# Patient Record
Sex: MALE | Race: WHITE | NOT HISPANIC OR LATINO | Employment: FULL TIME | URBAN - METROPOLITAN AREA
[De-identification: names, ages, dates, MRNs, and addresses within clinical notes are randomized per-mention and may not be internally consistent; named-entity substitution may affect disease eponyms.]

---

## 2019-07-01 ENCOUNTER — OFFICE VISIT (OUTPATIENT)
Dept: OBGYN CLINIC | Facility: CLINIC | Age: 39
End: 2019-07-01
Payer: COMMERCIAL

## 2019-07-01 VITALS
HEIGHT: 77 IN | SYSTOLIC BLOOD PRESSURE: 116 MMHG | HEART RATE: 72 BPM | DIASTOLIC BLOOD PRESSURE: 75 MMHG | BODY MASS INDEX: 27.16 KG/M2 | WEIGHT: 230 LBS

## 2019-07-01 DIAGNOSIS — M25.512 ACUTE PAIN OF LEFT SHOULDER: Primary | ICD-10-CM

## 2019-07-01 PROCEDURE — 99203 OFFICE O/P NEW LOW 30 MIN: CPT | Performed by: ORTHOPAEDIC SURGERY

## 2019-07-01 RX ORDER — GABAPENTIN 600 MG/1
600 TABLET ORAL 3 TIMES DAILY
COMMUNITY

## 2019-07-01 RX ORDER — IBUPROFEN 800 MG/1
TABLET ORAL EVERY 6 HOURS PRN
COMMUNITY

## 2019-07-01 NOTE — PROGRESS NOTES
Assessment/Plan:  1  Acute pain of left shoulder  MRI shoulder left wo contrast       Scribe Attestation    I,:   Loni Haywood am acting as a scribe while in the presence of the attending physician :        I,:   Katherine Fuentes, DO personally performed the services described in this documentation    as scribed in my presence :              Ana Paula Barbour has left sided shoulder pain concerning for a proximal biceps rupture  I would like to order an MRI of the left shoulder to rule out a proximal biceps tear or rotator cuff tear  He can discontinue the use of the sling as needed  We discussed that proximal biceps ruptures do not need surgical intervention 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  He can take OTC anti-inflammatory and ice the area as needed  He will follow up once the MRI is completed  Subjective:   Nicki Kan is a 45 y o  male who presents to the office today for left sided shoulder pain  He states yesterday he was trying to stop his under to 150 lb son from jumping on his daughter in the pool and went to grab him with an outstretched arm  States he felt a severe sharp pain in his left arm and shoulder  Presented to [facility] emergency room where x-rays were taken which showed no acute fracture dislocation  He was placed in a sling and instructed follow up with Orthopedics  At today's appointment he states the pain is feels as though it is deep inside his shoulder and is worsened with shoulder movement  He states his pain is a constant moderate to severe pain in the left shoulder and at the elbow  He has been wearing the sling as needed but did take it off to sleep because it was too uncomfortable  He feels as though he is very weak in his left arm and is unable to grab anything due to the weakness and pain  He does have a history of a right sided distal biceps tear with surgical intervention and states this pain feels different   He denies any numbness or tingling into his hand at today's appointment  Review of Systems   Constitutional: Negative for chills, fever and unexpected weight change  HENT: Negative for hearing loss, nosebleeds and sore throat  Eyes: Negative for pain, redness and visual disturbance  Respiratory: Negative for cough, shortness of breath and wheezing  Cardiovascular: Negative for chest pain, palpitations and leg swelling  Gastrointestinal: Negative for abdominal pain, nausea and vomiting  Endocrine: Negative for polyphagia and polyuria  Genitourinary: Negative for dysuria and hematuria  Musculoskeletal: Positive for arthralgias and myalgias  See HPI   Skin: Negative for rash and wound  Neurological: Negative for dizziness, numbness and headaches  Psychiatric/Behavioral: Negative for decreased concentration and suicidal ideas  The patient is not nervous/anxious  History reviewed  No pertinent past medical history      Past Surgical History:   Procedure Laterality Date    BICEPS TENDON REPAIR      SPINAL FUSION      double spinal fusion       Family History   Problem Relation Age of Onset    Cancer Mother     No Known Problems Father     No Known Problems Sister     No Known Problems Brother     No Known Problems Maternal Aunt     No Known Problems Maternal Uncle     No Known Problems Paternal Aunt     No Known Problems Paternal Uncle     No Known Problems Maternal Grandmother     No Known Problems Maternal Grandfather     No Known Problems Paternal Grandmother     No Known Problems Paternal Grandfather        Social History     Occupational History    Not on file   Tobacco Use    Smoking status: Current Every Day Smoker    Smokeless tobacco: Never Used   Substance and Sexual Activity    Alcohol use: Yes     Comment: social    Drug use: Never    Sexual activity: Not on file         Current Outpatient Medications:     gabapentin (NEURONTIN) 600 MG tablet, Take 600 mg by mouth 3 (three) times a day, Disp: , Rfl:     ibuprofen (MOTRIN) 800 mg tablet, Take by mouth every 6 (six) hours as needed for mild pain, Disp: , Rfl:     oxyCODONE HCl 5 MG TABA, Take by mouth, Disp: , Rfl:     TIZANIDINE HCL PO, Take by mouth, Disp: , Rfl:     No Known Allergies    Objective:  Vitals:    07/01/19 1442   BP: 116/75   Pulse: 72       Left Shoulder Exam     Tenderness   The patient is experiencing tenderness in the biceps tendon  Range of Motion   Active abduction: abnormal   Passive abduction: abnormal   Extension: abnormal   External rotation: normal   Forward flexion: abnormal   Internal rotation 0 degrees: normal   Internal rotation 90 degrees: abnormal     Muscle Strength   Abduction: 4/5     Tests   Drop arm: negative    Other   Sensation: normal     Comments:  Tenderness at long head biceps tendon and distal biceps tendons   Decreased range of motion of shoulder limited to pain on today's exam    Pain with Supination   5/5 supination  5/5 pronation    (-) Hook Test  (+) Empty Can   (+) [place]'s              Physical Exam   Constitutional: He is oriented to person, place, and time  He appears well-developed  HENT:   Head: Normocephalic and atraumatic  Eyes: Conjunctivae are normal    Neck: Neck supple  Cardiovascular: Intact distal pulses  Pulmonary/Chest: Effort normal  No respiratory distress  Neurological: He is alert and oriented to person, place, and time  Skin: Skin is warm and dry  Psychiatric: He has a normal mood and affect  His behavior is normal    Vitals reviewed  Outside left sided shoulder x-ray radiologist read states no acute fracture or dislocation  No reviewable x-ray images available in the office today

## 2019-07-10 ENCOUNTER — HOSPITAL ENCOUNTER (OUTPATIENT)
Dept: RADIOLOGY | Facility: HOSPITAL | Age: 39
Discharge: HOME | End: 2019-07-10
Attending: ORTHOPAEDIC SURGERY
Payer: COMMERCIAL

## 2019-07-10 DIAGNOSIS — M25.512 ACUTE PAIN OF LEFT SHOULDER: ICD-10-CM

## 2019-07-10 PROCEDURE — 73221 MRI JOINT UPR EXTREM W/O DYE: CPT

## 2019-07-11 DIAGNOSIS — S43.432A LABRAL TEAR OF SHOULDER, LEFT, INITIAL ENCOUNTER: Primary | ICD-10-CM

## 2019-07-12 ENCOUNTER — TELEPHONE (OUTPATIENT)
Dept: OBGYN CLINIC | Facility: CLINIC | Age: 39
End: 2019-07-12

## 2019-07-12 NOTE — TELEPHONE ENCOUNTER
----- Message from Erickson Braden DO sent at 7/11/2019 10:04 AM EDT -----  Madai Orozco had an MRI of his left shoulder  It did not show a rotator cuff tear or a biceps tendon rupture  The MRI did show a slightly abnormal appearance of his labrum which could suggest a labral tear in his shoulder  If this is the cause of his pain then I would recommend physical therapy  I will write a script and have him pick it up at the office  If he wants to return to the office for review of the MRI and repeat evaluation we can do that as well

## 2019-07-15 NOTE — TELEPHONE ENCOUNTER
Spoke to patient and he stated he will be doing physical therapy  I will make sure script is put in  Patient also states he has been out of work and would like to know what he should be doing in regards to work status  Please advise

## 2019-07-15 NOTE — TELEPHONE ENCOUNTER
We can write him to return if he feels he can  If he feels he can not perform his job yet, we can wait until he does some PT to clear him to return

## 2019-07-15 NOTE — TELEPHONE ENCOUNTER
I spoke to patient and he would like to wait till he completes some PT   He states he will call back to make a follow up with Justina Villagran

## 2022-09-28 ENCOUNTER — APPOINTMENT (OUTPATIENT)
Dept: RADIOLOGY | Facility: CLINIC | Age: 42
End: 2022-09-28
Payer: COMMERCIAL

## 2022-09-28 ENCOUNTER — OFFICE VISIT (OUTPATIENT)
Dept: URGENT CARE | Facility: CLINIC | Age: 42
End: 2022-09-28
Payer: COMMERCIAL

## 2022-09-28 VITALS
RESPIRATION RATE: 18 BRPM | TEMPERATURE: 97.9 F | HEIGHT: 77 IN | BODY MASS INDEX: 24.21 KG/M2 | HEART RATE: 78 BPM | WEIGHT: 205 LBS | OXYGEN SATURATION: 97 %

## 2022-09-28 DIAGNOSIS — M77.12 LATERAL EPICONDYLITIS OF LEFT ELBOW: ICD-10-CM

## 2022-09-28 DIAGNOSIS — M77.12 LATERAL EPICONDYLITIS OF LEFT ELBOW: Primary | ICD-10-CM

## 2022-09-28 PROBLEM — S86.819A STRAIN OF DISTAL BICEPS FEMORIS TENDON: Status: ACTIVE | Noted: 2019-12-26

## 2022-09-28 PROCEDURE — 73080 X-RAY EXAM OF ELBOW: CPT

## 2022-09-28 PROCEDURE — 99203 OFFICE O/P NEW LOW 30 MIN: CPT

## 2022-09-28 RX ORDER — PREDNISONE 20 MG/1
40 TABLET ORAL DAILY
Qty: 10 TABLET | Refills: 0 | Status: SHIPPED | OUTPATIENT
Start: 2022-09-28 | End: 2022-10-03

## 2022-09-28 NOTE — PROGRESS NOTES
[facility] [employer] [facility]      NAME: Rolf Mendieta is a 39 y o  male  : 1980    MRN: [id number]  DATE: 2022  TIME: 5:30 PM    Assessment and Plan   Lateral epicondylitis of left elbow [M77 12]  1  Lateral epicondylitis of left elbow  XR elbow 3+ vw left    Ambulatory Referral to Orthopedic Surgery     XR elbow negative for fracture  Will call if final read changes from this read  Start with ibuprofen for pain  Follow up with orthopedics given prolonged nature of symptoms      Patient Instructions     -Practice RICE : rest the injured area, apply ice, apply compression such as an ACE wrap to the site, and elevation- keep the injured area up     -For pain take an NSAID such as ibuprofen, Aleve, or motrin if able  This will decrease pain and swelling to the area  If unable to take, can try over the counter Voltaren cream instead  -If pain continues can also take these medications - can try over the counter medications (these do not need a prescription) such as acetaminophen (Tylenol), lidocaine or other pain patches, Voltaren cream, or lidocaine cream      -Follow up with orthopedics  There is an orthopedics site in the same building as the care now call [phone] to schedule an appointment  Chief Complaint     Chief Complaint   Patient presents with    Arm Pain     Left arm, started 1 year ago, worsted approximately 2 months ago, past week has been unbearable          History of Present Illness       Presents with left arm pain that started over a year ago  Worsened 2 months ago but still week has been unbearable  At home takes oxycodone, iburpofen, tizanidine and gabapentin for pain  Feels like he cannot even  glass due to pain  Pain to the lateral elbow, painful to touch/palpation  Worse with certain movements  Numbness and tingling at times  Pain can get to 10/10  No known injury at the onset of symptoms  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  Review of Systems   Review of Systems   Constitutional: Negative for chills, fatigue and fever  HENT: Negative for congestion  Respiratory: Negative for cough and shortness of breath  Cardiovascular: Negative for chest pain  Musculoskeletal: Positive for myalgias  Skin: Negative for color change, pallor, rash and wound  Neurological: Negative for syncope  Psychiatric/Behavioral: Negative for confusion           Current Medications       Current Outpatient Medications:     gabapentin (NEURONTIN) 600 MG tablet, Take 600 mg by mouth 3 (three) times a day, Disp: , Rfl:     ibuprofen (MOTRIN) 800 mg tablet, Take by mouth every 6 (six) hours as needed for mild pain, Disp: , Rfl:     oxyCODONE HCl 15 MG TABA, Take by mouth, Disp: , Rfl:     TIZANIDINE HCL PO, Take by mouth, Disp: , Rfl:     Current Allergies     Allergies as of 09/28/2022 - Reviewed 09/28/2022   Allergen Reaction Noted    Coconut oil - food allergy Anaphylaxis 06/27/2016    Pineapple - food allergy Anaphylaxis 04/09/2015    Cyclobenzaprine Other (See Comments) 07/15/2015    Metoclopramide Confusion 07/15/2015    Pregabalin Angioedema 10/11/2016    Prochlorperazine Confusion 08/26/2015            The following portions of the patient's history were reviewed and updated as appropriate: allergies, current medications, past family history, past medical history, past social history, past surgical history and problem list      Past Medical History:   Diagnosis Date    Finger amputation, traumatic     History of spinal fracture        Past Surgical History:   Procedure Laterality Date    BICEPS TENDON REPAIR      RECONSTRUCTION TENDON PULLEY W/ TENDON / FASCIAL GRAFT OF HAND / FINGER Left     SPINAL FUSION      double spinal fusion       Family History   Problem Relation Age of Onset    Cancer Mother     No Known Problems Father     No Known Problems Sister     No Known Problems Brother     No Known Problems Maternal Aunt  No Known Problems Maternal Uncle     No Known Problems Paternal Aunt     No Known Problems Paternal Uncle     No Known Problems Maternal Grandmother     No Known Problems Maternal Grandfather     No Known Problems Paternal Grandmother     No Known Problems Paternal Grandfather          Medications have been verified  Objective   Pulse 78   Temp 97 9 °F (36 6 °C) (Temporal)   Resp 18   Ht 6' 5" (1 956 m)   Wt 93 kg (205 lb)   SpO2 97%   BMI 24 31 kg/m²        Physical Exam     Physical Exam  Vitals reviewed  Constitutional:       Appearance: Normal appearance  Cardiovascular:      Rate and Rhythm: Normal rate and regular rhythm  Pulses: Normal pulses  Heart sounds: Normal heart sounds  No murmur heard  Pulmonary:      Effort: Pulmonary effort is normal  No respiratory distress  Breath sounds: Normal breath sounds  Musculoskeletal:      Left elbow: No swelling, deformity, effusion or lacerations  Decreased range of motion  Tenderness present in lateral epicondyle and olecranon process (mild)  No radial head or medial epicondyle tenderness  Comments: Radial pulse intact +2,  strength 4/5  Cap refill <2 seconds and sensation to light touch intact  Skin:     General: Skin is warm and dry  Capillary Refill: Capillary refill takes less than 2 seconds  Neurological:      General: No focal deficit present  Mental Status: He is alert and oriented to person, place, and time     Psychiatric:         Mood and Affect: Mood normal          Behavior: Behavior normal

## 2022-09-28 NOTE — PATIENT INSTRUCTIONS
Practice RICE : rest the injured area, apply ice, apply compression such as an ACE wrap to the site, and elevation- keep the injured area up     -For pain take an NSAID such as ibuprofen, Aleve, or motrin if able  This will decrease pain and swelling to the area  If unable to take, can try over the counter Voltaren cream instead  -If pain continues can also take these medications - can try over the counter medications (these do not need a prescription) such as acetaminophen (Tylenol), lidocaine or other pain patches, Voltaren cream, or lidocaine cream      -Follow up with orthopedics  There is an orthopedics site in the same building as the care now call [phone] to schedule an appointment

## 2024-06-18 ENCOUNTER — OFFICE VISIT (OUTPATIENT)
Age: 44
End: 2024-06-18
Payer: COMMERCIAL

## 2024-06-18 VITALS
BODY MASS INDEX: 23.14 KG/M2 | TEMPERATURE: 98.7 F | WEIGHT: 196 LBS | RESPIRATION RATE: 16 BRPM | OXYGEN SATURATION: 97 % | HEIGHT: 77 IN | HEART RATE: 66 BPM

## 2024-06-18 DIAGNOSIS — Z98.1 S/P LUMBAR SPINAL FUSION: Primary | ICD-10-CM

## 2024-06-18 PROCEDURE — 99203 OFFICE O/P NEW LOW 30 MIN: CPT | Performed by: PHYSICIAN ASSISTANT

## 2024-06-18 NOTE — ASSESSMENT & PLAN NOTE
Presents for evluation of low back pain with BLE radiculopathy   History of L4-S1 PLDF at [facility] in 2016   No recent care at [facility]     Imaging:   No recent imaging     Plan:   Continue to monitor symptoms   No imaging available to review at this time   Continue taking pain medication as prescribed.   Patient currently from PCP however this provider just retired and he will need to establish care with new PCP.    Referral placed to PM provider  No recent PT   Continue with exercises and stretching provided by previous surgeon following prior surgery. Patient states he does these daily  Continue to be active with walking and low impact activities   Will begin work up with referrals and imaging given history of spinal fusion   XR lumbar spine with flexion and extension views  MRI lumbar spine with and without contrast given radicular features and a past history of spinal surgery  Follow up after imaging has been completed. Encouraged to call with questions or concerns

## 2024-06-18 NOTE — LETTER
[facility] [place]  [address]  [facility] [address]  [place] [address]  [phone]  Dept: [phone]    June 18, 2024     Patient: Bebeto Mg   YOB: 1980   Date of Visit:        To Whom it May Concern:    Bebeto Mg is under my professional care. He was seen in the neurosurgical office on 6/18/2024. He  will require additional work up and evaluation with subsequent follow up appointments .     If you have any questions or concerns, please don't hesitate to call.         Sincerely,          Eh Hernandez PA-C

## 2024-06-18 NOTE — PROGRESS NOTES
Neurosurgery Office Note  Bebeto Mg 43 y.o. male MRN: [id number]      Assessment & Plan     S/P lumbar spinal fusion  Presents for evluation of low back pain with BLE radiculopathy   History of L4-S1 PLDF at [facility] in 2016   No recent care at [facility]     Imaging:   No recent imaging     Plan:   Continue to monitor symptoms   No imaging available to review at this time   Continue taking pain medication as prescribed.   Patient currently from PCP however this provider just retired and he will need to establish care with new PCP.    Referral placed to PM provider  No recent PT   Continue with exercises and stretching provided by previous surgeon following prior surgery. Patient states he does these daily  Continue to be active with walking and low impact activities   Will begin work up with referrals and imaging given history of spinal fusion   XR lumbar spine with flexion and extension views  MRI lumbar spine with and without contrast given radicular features and a past history of spinal surgery  Follow up after imaging has been completed. Encouraged to call with questions or concerns       Diagnoses and all orders for this visit:    S/P lumbar spinal fusion  -     Ambulatory referral to Spine & Pain Management; Future  -     XR spine lumbar complete w bending minimum 6 views; Future  -     MRI lumbar spine with and without contrast; Future  -     BUN; Future  -     Creatinine, serum; Future          I have spent a total time of 30 minutes on 06/18/24 in caring for this patient including Prognosis, Risks and benefits of tx options, Instructions for management, Patient and family education, Importance of tx compliance, Risk factor reductions, Impressions, Counseling / Coordination of care, Documenting in the medical record, Reviewing / ordering tests, medicine, procedures  , and Obtaining or reviewing history  .      CHIEF COMPLAINT    Chief Complaint   Patient presents with    Consult    Back Pain        HISTORY    History of Present Illness     43 y.o. year old male who presents to the outpatient neurosurgical office to establish care. Patient is a long standing chronic pain patient. He has a history of L4-S1 PLDF in 2016 at [facility]. He was following up with this provider until he reportedly retired. Patient after surgery was good for about 1 year. He was off opiates and minimal pain. Unfortunately he developed worsening pain with activities including work. He has been on chronic opiates for the last 6-7 years. He was following with his PCP for most of his medication. He takes oxycodone, ibuprofen and gabapentin to assist with the pain. His oxy 15mg helps take the edge off and allow his to be functional. His PCP however just decided to retire and he has not yet found a new provider. He is concerned regarding his pain medication prescription. His back pain has slowly been progressive and worsening over the last 5 months. He reports some increases in his sharp stabbing back pain as well as the pain that radiates into the lower extremities.  His radiculopathy is bilateral although he reports it to be worse on the right side compared to left.  The patient reports most of his symptoms are similar to that of prior to his last surgery.  However he does report some different symptoms which were not present previously.  The intensity is sharp stabbing pains are worse than prior.  He also reports some accompanied with tingling and numbness in the feet which is worse with sitting for prolonged periods of time.  He reports that he sits for longer than 30 to 45 minutes he will begin to have numbness in the bilateral lower extremities that sounds like paresthesias.  Patient reports he was taking his blood work at his PCP appointment subsequently retired.         See Discussion    REVIEW OF SYSTEMS    Review of Systems   Musculoskeletal:  Positive for back pain (lbp into back of legs to feet, right is worse), gait  problem and myalgias (spasms in back & legs).   Neurological:  Positive for weakness (at times) and numbness (bi/legs with N&T).   Psychiatric/Behavioral: Negative.     All other systems reviewed and are negative.      ROS obtained by MA. Reviewed. See HPI.     Meds/Allergies     Current Outpatient Medications   Medication Sig Dispense Refill    gabapentin (NEURONTIN) 600 MG tablet Take 600 mg by mouth 3 (three) times a day      ibuprofen (MOTRIN) 800 mg tablet Take by mouth every 6 (six) hours as needed for mild pain      oxyCODONE HCl 15 MG TABA Take by mouth       No current facility-administered medications for this visit.       Allergies   Allergen Reactions    Coconut Oil - Food Allergy Anaphylaxis     Please validate this allergy with the patient.  If the patient reports not having an allergy, please delete the allergy.  If the patient reports having this allergy, then please remove this comment and update the allergy information accordingly.       Pineapple - Food Allergy Anaphylaxis    Cyclobenzaprine Other (See Comments)    Metoclopramide Confusion     agitation      Pregabalin Angioedema    Prochlorperazine Confusion     agitation         PAST HISTORY    Past Medical History:   Diagnosis Date    Finger amputation, traumatic     History of spinal fracture        Past Surgical History:   Procedure Laterality Date    BICEPS TENDON REPAIR      RECONSTRUCTION TENDON PULLEY W/ TENDON / FASCIAL GRAFT OF HAND / FINGER Left     SPINAL FUSION      double spinal fusion       Social History     Tobacco Use    Smoking status: Every Day     Types: Cigarettes    Smokeless tobacco: Never   Vaping Use    Vaping status: Every Day   Substance Use Topics    Alcohol use: Yes     Comment: social    Drug use: Never       Family History   Problem Relation Age of Onset    Cancer Mother     No Known Problems Father     No Known Problems Sister     No Known Problems Brother     No Known Problems Maternal Aunt     No Known Problems  "Maternal Uncle     No Known Problems Paternal Aunt     No Known Problems Paternal Uncle     No Known Problems Maternal Grandmother     No Known Problems Maternal Grandfather     No Known Problems Paternal Grandmother     No Known Problems Paternal Grandfather          Above history personally reviewed.       EXAM    Vitals:Pulse 66, temperature 98.7 °F (37.1 °C), temperature source Temporal, resp. rate 16, height 6' 5\" (1.956 m), weight 88.9 kg (196 lb), SpO2 97%.,Body mass index is 23.24 kg/m².     Physical Exam  Constitutional:       Appearance: Normal appearance.   HENT:      Head: Atraumatic.   Eyes:      Extraocular Movements: Extraocular movements intact and EOM normal.   Pulmonary:      Effort: Pulmonary effort is normal.   Musculoskeletal:         General: Tenderness (mild tenderness just superior to prior incision) present.      Cervical back: Normal range of motion and neck supple.   Neurological:      Mental Status: He is alert and oriented to person, place, and time.      Cranial Nerves: No cranial nerve deficit.      Sensory: Sensory deficit present.      Motor: Motor strength is normal.No weakness.      Gait: Gait is intact.   Psychiatric:         Speech: Speech normal.         Neurologic Exam     Mental Status   Oriented to person, place, and time.   Attention: normal.   Speech: speech is normal   Level of consciousness: alert  Knowledge: good.   Normal comprehension.     Cranial Nerves     CN III, IV, VI   Extraocular motions are normal.   Upgaze: normal  Downgaze: normal    CN VII   Facial expression full, symmetric.     CN VIII   CN VIII normal.   Hearing: intact    Motor Exam   Muscle bulk: normal  Right arm tone: normal  Left arm tone: normal  Right leg tone: normal  Left leg tone: normal    Strength   Strength 5/5 throughout. No focal weakness. Some tremoring noted in the BLE with HF testing.      Sensory Exam   Right arm light touch: normal  Left arm light touch: normal  Left leg light touch: " normal  Decreased light touch below the knee on the L with absent sensation to light touch in the medial R foot.      Gait, Coordination, and Reflexes     Gait  Gait: normal    Tremor   Resting tremor: absent  Action tremor: absent    MEDICAL DECISION MAKING    Imaging Studies:     No results found.    I have personally reviewed pertinent reports.   and I have personally reviewed pertinent films in PACS

## 2024-06-28 ENCOUNTER — HOSPITAL ENCOUNTER (OUTPATIENT)
Dept: MRI IMAGING | Facility: HOSPITAL | Age: 44
End: 2024-06-28
Payer: COMMERCIAL

## 2024-06-28 DIAGNOSIS — Z98.1 S/P LUMBAR SPINAL FUSION: ICD-10-CM

## 2024-06-28 PROCEDURE — 72158 MRI LUMBAR SPINE W/O & W/DYE: CPT

## 2024-06-28 PROCEDURE — A9585 GADOBUTROL INJECTION: HCPCS | Performed by: PHYSICIAN ASSISTANT

## 2024-06-28 RX ORDER — GADOBUTROL 604.72 MG/ML
8 INJECTION INTRAVENOUS
Status: COMPLETED | OUTPATIENT
Start: 2024-06-28 | End: 2024-06-28

## 2024-06-28 RX ADMIN — GADOBUTROL 8 ML: 604.72 INJECTION INTRAVENOUS at 07:49

## 2024-07-01 ENCOUNTER — TELEPHONE (OUTPATIENT)
Age: 44
End: 2024-07-01

## 2024-07-01 NOTE — TELEPHONE ENCOUNTER
Pt called for follow up appt, pt had MRI and is going for Xray tomorrow, appt made for 7/5/24 In [place] at 945am. Directions to office given.    Pt is requesting new work note that indicates when he was 5/30/24, we first saw pt on 6/18/24 and return to work still TBD, note needs to say why he is out, pt has appt on 7/5/24.

## 2024-07-03 ENCOUNTER — HOSPITAL ENCOUNTER (OUTPATIENT)
Dept: RADIOLOGY | Facility: HOSPITAL | Age: 44
Discharge: HOME | End: 2024-07-03
Payer: COMMERCIAL

## 2024-07-03 ENCOUNTER — TELEPHONE (OUTPATIENT)
Dept: NEUROSURGERY | Facility: CLINIC | Age: 44
End: 2024-07-03

## 2024-07-03 DIAGNOSIS — Z98.1 S/P LUMBAR SPINAL FUSION: ICD-10-CM

## 2024-07-03 PROCEDURE — 72114 X-RAY EXAM L-S SPINE BENDING: CPT

## 2024-07-03 NOTE — TELEPHONE ENCOUNTER
Called pt to confirm and remind to have xr completed prior to appt on 07/05. Pt stated he will have it completed.

## 2024-07-05 ENCOUNTER — OFFICE VISIT (OUTPATIENT)
Dept: NEUROSURGERY | Facility: CLINIC | Age: 44
End: 2024-07-05
Payer: COMMERCIAL

## 2024-07-05 VITALS
SYSTOLIC BLOOD PRESSURE: 132 MMHG | OXYGEN SATURATION: 98 % | BODY MASS INDEX: 23.14 KG/M2 | TEMPERATURE: 98.1 F | HEART RATE: 73 BPM | DIASTOLIC BLOOD PRESSURE: 84 MMHG | HEIGHT: 77 IN | WEIGHT: 196 LBS | RESPIRATION RATE: 17 BRPM

## 2024-07-05 DIAGNOSIS — M54.42 CHRONIC BILATERAL LOW BACK PAIN WITH BILATERAL SCIATICA: Primary | ICD-10-CM

## 2024-07-05 DIAGNOSIS — Z98.1 S/P LUMBAR SPINAL FUSION: ICD-10-CM

## 2024-07-05 DIAGNOSIS — M54.41 CHRONIC BILATERAL LOW BACK PAIN WITH BILATERAL SCIATICA: Primary | ICD-10-CM

## 2024-07-05 DIAGNOSIS — G89.29 CHRONIC BILATERAL LOW BACK PAIN WITH BILATERAL SCIATICA: Primary | ICD-10-CM

## 2024-07-05 PROCEDURE — 99214 OFFICE O/P EST MOD 30 MIN: CPT | Performed by: PHYSICIAN ASSISTANT

## 2024-07-05 RX ORDER — ACETAMINOPHEN 500 MG
1 TABLET ORAL AS NEEDED
COMMUNITY

## 2024-07-05 NOTE — ASSESSMENT & PLAN NOTE
Presents to review imaging regarding his low back pain with BLE radiculopathy   History of L4-S1 PLDF at [facility] in 2016     Imaging:   MRI lumbar spine 6/28/2024: L4-S1 combined anterior and posterior fusions with laminectomy changes.  Multilevel lumbar spondylotic changes with moderate foraminal narrowing.  No high-grade central or foraminal stenosis.  X-ray lumbar spine 7/3/2024: Redemonstrated postop changes of posterior fusion L4-S1.  No radiographic findings to suggest hardware failure.  No instability with flexion or extension views.    Plan:   Continue to monitor symptoms   Imaging reviewed in room with patient  XR lumbar spine without gross hardware instability noted   Mri lumbar spine with some foraminal narrowing at prior surgical levels, but no severe stenosis noted at this time   No surgical intervention recommended at this time   Continue taking pain medication as prescribed  Patient established with new PCP as previous provider retired  Follow up with PM scheduled for 7/22  Patient may be an appropriate spinal cord stimulator candidate. We did discuss this in the office today. Follow up with PM for additional discussion  Continue with exercises and stretching provided by previous surgeon following prior surgery. Patient states he does these daily  Continue to be active with walking and low impact activities   Follow up after imaging has been completed. Encouraged to call with questions or concerns

## 2024-07-05 NOTE — PROGRESS NOTES
Neurosurgery Office Note  Bebeto Mg 43 y.o. male MRN: [id number]      Assessment & Plan     S/P lumbar spinal fusion  Presents to review imaging regarding his low back pain with BLE radiculopathy   History of L4-S1 PLDF at [facility] in 2016     Imaging:   MRI lumbar spine 6/28/2024: L4-S1 combined anterior and posterior fusions with laminectomy changes.  Multilevel lumbar spondylotic changes with moderate foraminal narrowing.  No high-grade central or foraminal stenosis.  X-ray lumbar spine 7/3/2024: Redemonstrated postop changes of posterior fusion L4-S1.  No radiographic findings to suggest hardware failure.  No instability with flexion or extension views.    Plan:   Continue to monitor symptoms   Imaging reviewed in room with patient  XR lumbar spine without gross hardware instability noted   Mri lumbar spine with some foraminal narrowing at prior surgical levels, but no severe stenosis noted at this time   No surgical intervention recommended at this time   Continue taking pain medication as prescribed  Patient established with new PCP as previous provider retired  Follow up with PM scheduled for 7/22  Patient may be an appropriate spinal cord stimulator candidate. We did discuss this in the office today. Follow up with PM for additional discussion  Continue with exercises and stretching provided by previous surgeon following prior surgery. Patient states he does these daily  Continue to be active with walking and low impact activities   Follow up after imaging has been completed. Encouraged to call with questions or concerns       Diagnoses and all orders for this visit:    Chronic bilateral low back pain with bilateral sciatica    S/P lumbar spinal fusion    Other orders  -     acetaminophen (TYLENOL) 500 mg tablet; Take 1 tablet by mouth as needed          I have spent a total time of 25 minutes on 07/05/24 in caring for this patient including Diagnostic results, Prognosis, Risks and benefits of  tx options, Instructions for management, Patient and family education, Importance of tx compliance, Risk factor reductions, Impressions, Counseling / Coordination of care, Documenting in the medical record, Reviewing / ordering tests, medicine, procedures  , and Obtaining or reviewing history  .      CHIEF COMPLAINT    Chief Complaint   Patient presents with    Follow-up     Follow up with imaging        HISTORY    History of Present Illness     43 y.o. year old male who returns to the outpatient neurosurgical review his completed XR and MRI imaging.     Patient is a long standing chronic pain patient. He has a history of L4-S1 PLDF in 2016 at [facility]. He was following up with this provider until he reportedly retired. Patient after surgery was good for about 1 year. He was off opiates and minimal pain. Unfortunately he developed worsening pain with activities including work. He has been on chronic opiates for the last 6-7 years. He was following with his PCP for most of his medication. He takes oxycodone, ibuprofen and gabapentin to assist with the pain. His oxy 15mg helps take the edge off and allow his to be functional. His PCP was his primary prescribed but unfortunately just decided to retire. Since his last appointment he has established care with a new provider.     His complaints are the same as prior. His back pain has progressively gotten worse over the last 5 months. He reports some increases in his sharp stabbing back pain as well as the pain that radiates into the lower extremities.  His radiculopathy is bilateral although he reports it to be worse on the right side compared to left.  The right side seems to radiate all the way down to his foot.  The left side seems to radiate to the lateral portion of the knee.  The patient reports most of his symptoms are similar to that of prior to his last surgery.  However he does report some different symptoms which were not present previously.  The  intensity is sharp stabbing pains are worse than prior.  He also reports some tingling and numbness in the feet which is worse with sitting for prolonged periods of time.         See Discussion    REVIEW OF SYSTEMS    Review of Systems   Constitutional: Negative.    HENT: Negative.  Negative for trouble swallowing.    Eyes: Negative.    Respiratory: Negative.     Cardiovascular: Negative.    Gastrointestinal: Negative.    Endocrine: Negative.    Genitourinary: Negative.    Musculoskeletal:  Positive for back pain (lbp radiates down b/l legs more right side all the way to foot, left leg stops at knee), gait problem (unbalanced when ambulating) and myalgias (in back).   Skin: Negative.    Allergic/Immunologic: Negative.    Neurological:  Positive for weakness (occa right leg gives out) and numbness (n+t in right foot).   Hematological:  Does not bruise/bleed easily.   Psychiatric/Behavioral:  Positive for sleep disturbance (due to pain).        ROS obtained by MA. Reviewed. See HPI.     Meds/Allergies     Current Outpatient Medications   Medication Sig Dispense Refill    acetaminophen (TYLENOL) 500 mg tablet Take 1 tablet by mouth as needed      gabapentin (NEURONTIN) 600 MG tablet Take 600 mg by mouth 3 (three) times a day      ibuprofen (MOTRIN) 800 mg tablet Take by mouth every 6 (six) hours as needed for mild pain      oxyCODONE HCl 15 MG TABA Take by mouth       No current facility-administered medications for this visit.       Allergies   Allergen Reactions    Coconut Oil - Food Allergy Anaphylaxis     Please validate this allergy with the patient.  If the patient reports not having an allergy, please delete the allergy.  If the patient reports having this allergy, then please remove this comment and update the allergy information accordingly.       Pineapple - Food Allergy Anaphylaxis    Cyclobenzaprine Other (See Comments)    Metoclopramide Confusion     agitation      Pregabalin Angioedema    Prochlorperazine  "Confusion     agitation         PAST HISTORY    Past Medical History:   Diagnosis Date    Finger amputation, traumatic     History of spinal fracture        Past Surgical History:   Procedure Laterality Date    BICEPS TENDON REPAIR      RECONSTRUCTION TENDON PULLEY W/ TENDON / FASCIAL GRAFT OF HAND / FINGER Left     SPINAL FUSION      double spinal fusion       Social History     Tobacco Use    Smoking status: Every Day     Types: Cigarettes    Smokeless tobacco: Never   Vaping Use    Vaping status: Every Day   Substance Use Topics    Alcohol use: Yes     Comment: social    Drug use: Never       Family History   Problem Relation Age of Onset    Cancer Mother     No Known Problems Father     No Known Problems Sister     No Known Problems Brother     No Known Problems Maternal Aunt     No Known Problems Maternal Uncle     No Known Problems Paternal Aunt     No Known Problems Paternal Uncle     No Known Problems Maternal Grandmother     No Known Problems Maternal Grandfather     No Known Problems Paternal Grandmother     No Known Problems Paternal Grandfather          Above history personally reviewed.       EXAM    Vitals:Blood pressure 132/84, pulse 73, temperature 98.1 °F (36.7 °C), temperature source Temporal, resp. rate 17, height 6' 5\" (1.956 m), weight 88.9 kg (196 lb), SpO2 98%.,Body mass index is 23.24 kg/m².     Physical Exam  Constitutional:       Appearance: Normal appearance.   HENT:      Head: Atraumatic.   Eyes:      Extraocular Movements: Extraocular movements intact and EOM normal.   Pulmonary:      Effort: Pulmonary effort is normal.   Musculoskeletal:         General: Tenderness (mild tenderness just superior to prior incision) present.      Cervical back: Normal range of motion and neck supple.   Neurological:      Mental Status: He is alert and oriented to person, place, and time.      Cranial Nerves: No cranial nerve deficit.      Sensory: Sensory deficit present.      Motor: Motor strength is " normal.No weakness.      Gait: Gait is intact.   Psychiatric:         Speech: Speech normal.         Neurologic Exam     Mental Status   Oriented to person, place, and time.   Attention: normal.   Speech: speech is normal   Level of consciousness: alert  Knowledge: good.   Normal comprehension.     Cranial Nerves     CN III, IV, VI   Extraocular motions are normal.   Upgaze: normal  Downgaze: normal    CN VII   Facial expression full, symmetric.     CN VIII   CN VIII normal.   Hearing: intact    Motor Exam   Muscle bulk: normal  Right arm tone: normal  Left arm tone: normal  Right leg tone: normal  Left leg tone: normal    Strength   Strength 5/5 throughout. No focal weakness. Some tremoring noted in the BLE with HF testing.      Sensory Exam   Right arm light touch: normal  Left arm light touch: normal  Left leg light touch: normal  Decreased light touch below the knee on the L with absent sensation to light touch in the medial R foot.      Gait, Coordination, and Reflexes     Gait  Gait: normal    Tremor   Resting tremor: absent  Action tremor: absent    MEDICAL DECISION MAKING    Imaging Studies:     XR spine lumbar complete w bending minimum 6 views    Result Date: 7/4/2024  Narrative: LUMBAR SPINE INDICATION:   Arthrodesis status. COMPARISON: 6- MRI. VIEWS:  XR SPINE LUMBAR COMPLETE W BENDING MINIMUM 6 VIEWS FINDINGS: There are 5 non rib bearing lumbar vertebral bodies. There is no evidence of acute fracture or destructive osseous lesion. Alignment is unremarkable. No gross instability with flexion or extension. Redemonstrated postop changes of posterior fusion at L4-S1 utilizing bilateral posterior fredy/pedicle screw fixation and interbody spacer devices. No radiographic findings to suggest hardware failure. Lumbar vertebral body heights are maintained. Aortic calcifications. Soft tissues are unremarkable.     Impression: Redemonstrated postop changes of posterior fusion at L4-S1 utilizing bilateral  posterior fredy/pedicle screw fixation and interbody spacer devices. No radiographic findings to suggest hardware failure. Electronically signed: 07/04/2024 01:10 AM Villa Nobles MD    MRI lumbar spine with and without contrast    Result Date: 7/3/2024  Narrative: MRI LUMBAR SPINE WITH AND WITHOUT CONTRAST INDICATION: Z98.1: Arthrodesis status. COMPARISON: Lumbar spine radiographs 7/3/2024. TECHNIQUE:  Multiplanar, multisequence imaging of the lumbar spine was performed before and after gadolinium administration. . IV Contrast:  8 mL of Gadobutrol injection (SINGLE-DOSE) IMAGE QUALITY:  Diagnostic FINDINGS: VERTEBRAL BODIES: Posterior spinal instrumented fusion from L4-S1 with combined anterior discectomy and interbody fusion and laminectomy changes. Note this examination is not tailored for evaluation of the hardware, which results in artifact. Normal alignment. No acute fracture. SACRUM: No acute abnormality. DISTAL CORD AND CONUS:  Normal size and signal within the distal cord and conus. PARASPINAL SOFT TISSUES: No acute abnormality. LOWER THORACIC DISC SPACES: Spondylotic changes without acute critical central canal stenosis. LUMBAR DISC SPACES: Multilevel mild degenerative disc disease. L1-L2: Mild facet arthropathy. No significant neuroforaminal narrowing or spinal canal stenosis. L2-L3: Mild facet arthropathy. Mild bilateral neuroforaminal narrowing. No significant spinal canal stenosis. L3-L4: Disc bulge. Mild-moderate facet arthropathy. Mild-moderate bilateral neuroforaminal narrowing. No significant spinal canal stenosis. L4-L5: Discectomy with interbody fusion. Moderate facet arthropathy. Mild-moderate bilateral neuroforaminal narrowing. No significant spinal canal stenosis. L5-S1: Discectomy with interbody fusion. Marked facet arthropathy. Mild-moderate left and moderate right neuroforaminal narrowing. No significant spinal canal stenosis. POSTCONTRAST IMAGING:  No abnormal enhancement.     Impression: 1.  L4-S1 combined anterior and posterior fusion with laminectomy changes, noting this examination is not tailored for evaluation of the hardware. 2. Multilevel lumbar spine spondylotic changes with up to moderate neuroforaminal narrowing at multiple levels, as detailed above. No high-grade central canal stenosis. Workstation performed: [id number]       I have personally reviewed pertinent reports.   and I have personally reviewed pertinent films in PACS

## 2024-07-16 ENCOUNTER — TELEPHONE (OUTPATIENT)
Dept: PAIN MEDICINE | Facility: CLINIC | Age: 44
End: 2024-07-16